# Patient Record
Sex: MALE | Race: WHITE | URBAN - METROPOLITAN AREA
[De-identification: names, ages, dates, MRNs, and addresses within clinical notes are randomized per-mention and may not be internally consistent; named-entity substitution may affect disease eponyms.]

---

## 2018-02-08 ENCOUNTER — TELEPHONE (OUTPATIENT)
Dept: FAMILY MEDICINE | Facility: CLINIC | Age: 33
End: 2018-02-08

## 2018-02-08 DIAGNOSIS — F41.8 MIXED ANXIETY DEPRESSIVE DISORDER: ICD-10-CM

## 2018-02-08 NOTE — TELEPHONE ENCOUNTER
"Requested Prescriptions   Pending Prescriptions Disp Refills     FLUoxetine (PROZAC) 10 MG capsule [Pharmacy Med Name: FLUOXETINE 10MG CAPSULES]  Last Written Prescription Date:  12/23/2016  Last Fill Quantity: 90 capsule,  # refills: 3   Last Office Visit with [facility], P or [facility] prescribing provider:  12/23/2016  Future Office Visit:      90 capsule 0     Sig: TAKE 1 CAPSULE BY MOUTH DAILY    SSRIs Protocol Failed    2/8/2018  1:33 PM  PHQ-9 SCORE 9/24/2013 8/3/2015 12/23/2016   Total Score 2 3 -   Total Score - - 1     ALESSANDRA-7 SCORE 9/25/2013 12/21/2015 12/23/2016   Total Score 4 - -   Total Score - 9 5          Failed - Recent or future visit with authorizing provider    Patient had office visit in the last year or has a visit in the next 30 days with authorizing provider.  See \"Patient Info\" tab in inbasket, or \"Choose Columns\" in Meds & Orders section of the refill encounter.            Passed - Patient is age 18 or older          "

## 2018-02-09 RX ORDER — FLUOXETINE 10 MG/1
CAPSULE ORAL
Qty: 30 CAPSULE | Refills: 0 | Status: SHIPPED | OUTPATIENT
Start: 2018-02-09 | End: 2018-02-20

## 2018-02-09 NOTE — TELEPHONE ENCOUNTER
Message left on home number for patient to call back clinic.  NTBS for depression/anciety check with PCP.    Allison LAZARO

## 2018-02-20 ENCOUNTER — OFFICE VISIT (OUTPATIENT)
Dept: FAMILY MEDICINE | Facility: CLINIC | Age: 33
End: 2018-02-20
Payer: COMMERCIAL

## 2018-02-20 VITALS
SYSTOLIC BLOOD PRESSURE: 112 MMHG | HEART RATE: 68 BPM | BODY MASS INDEX: 28.36 KG/M2 | DIASTOLIC BLOOD PRESSURE: 74 MMHG | TEMPERATURE: 98 F | WEIGHT: 221 LBS | HEIGHT: 74 IN

## 2018-02-20 DIAGNOSIS — Z23 NEED FOR PROPHYLACTIC VACCINATION WITH TETANUS-DIPHTHERIA (TD): ICD-10-CM

## 2018-02-20 DIAGNOSIS — F41.0 PANIC ATTACK: ICD-10-CM

## 2018-02-20 DIAGNOSIS — L30.9 ECZEMA, UNSPECIFIED TYPE: ICD-10-CM

## 2018-02-20 DIAGNOSIS — F41.8 MIXED ANXIETY DEPRESSIVE DISORDER: Primary | ICD-10-CM

## 2018-02-20 PROCEDURE — 99213 OFFICE O/P EST LOW 20 MIN: CPT | Performed by: PHYSICIAN ASSISTANT

## 2018-02-20 PROCEDURE — 90715 TDAP VACCINE 7 YRS/> IM: CPT | Performed by: PHYSICIAN ASSISTANT

## 2018-02-20 RX ORDER — CLOBETASOL PROPIONATE 0.5 MG/G
CREAM TOPICAL
Qty: 45 G | Refills: 0 | Status: SHIPPED | OUTPATIENT
Start: 2018-02-20

## 2018-02-20 RX ORDER — FLUOXETINE 10 MG/1
CAPSULE ORAL
Qty: 90 CAPSULE | Refills: 3 | Status: SHIPPED | OUTPATIENT
Start: 2018-02-20 | End: 2019-03-02

## 2018-02-20 RX ORDER — CLONAZEPAM 1 MG/1
0.5-1 TABLET ORAL 2 TIMES DAILY PRN
Qty: 20 TABLET | Refills: 0 | Status: SHIPPED | OUTPATIENT
Start: 2018-02-20 | End: 2019-04-04

## 2018-02-20 ASSESSMENT — ANXIETY QUESTIONNAIRES
GAD7 TOTAL SCORE: 6
5. BEING SO RESTLESS THAT IT IS HARD TO SIT STILL: SEVERAL DAYS
2. NOT BEING ABLE TO STOP OR CONTROL WORRYING: SEVERAL DAYS
1. FEELING NERVOUS, ANXIOUS, OR ON EDGE: MORE THAN HALF THE DAYS
7. FEELING AFRAID AS IF SOMETHING AWFUL MIGHT HAPPEN: NOT AT ALL
6. BECOMING EASILY ANNOYED OR IRRITABLE: NOT AT ALL
3. WORRYING TOO MUCH ABOUT DIFFERENT THINGS: SEVERAL DAYS

## 2018-02-20 ASSESSMENT — PATIENT HEALTH QUESTIONNAIRE - PHQ9: 5. POOR APPETITE OR OVEREATING: SEVERAL DAYS

## 2018-02-20 NOTE — NURSING NOTE
Handed patient 1 printed out prescription for:    clonazePAM (KLONOPIN) 1 MG tablet    Veena Love CMA

## 2018-02-20 NOTE — MR AVS SNAPSHOT
"              After Visit Summary   2018    Gaston Tuttle    MRN: [id number]           Patient Information     Date Of Birth          1985        Visit Information        Provider Department      2018 11:20 AM Octavio Rubio PA-C [facility]        Today's Diagnoses     Mixed anxiety depressive disorder    -  1    Panic attack        Eczema, unspecified type        Need for prophylactic vaccination with tetanus-diphtheria (TD)           Follow-ups after your visit        Who to contact     If you have questions or need follow up information about today's clinic visit or your schedule please contact [facility] directly at [phone].  Normal or non-critical lab and imaging results will be communicated to you by MyChart, letter or phone within 4 business days after the clinic has received the results. If you do not hear from us within 7 days, please contact the clinic through MyChart or phone. If you have a critical or abnormal lab result, we will notify you by phone as soon as possible.  Submit refill requests through [facility] or call your pharmacy and they will forward the refill request to us. Please allow 3 business days for your refill to be completed.          Additional Information About Your Visit        MyChart Information     [facility] lets you send messages to your doctor, view your test results, renew your prescriptions, schedule appointments and more. To sign up, go to [email or web address] . Click on \"Log in\" on the left side of the screen, which will take you to the Welcome page. Then click on \"Sign up Now\" on the right side of the page.     You will be asked to enter the access code listed below, as well as some personal information. Please follow the directions to create your username and password.     Your access code is: [email or web address]  Expires: 2018 12:06 PM     Your access code will  in 90 days. If you need help or a new " "code, please call your [place] clinic or [phone].        Care EveryWhere ID     This is your Care EveryWhere ID. This could be used by other organizations to access your [place] medical records  [id number]        Your Vitals Were     Pulse Temperature Height BMI (Body Mass Index)          68 98  F (36.7  C) (Oral) 6' 2\" (1.88 m) 28.37 kg/m2         Blood Pressure from Last 3 Encounters:   02/20/18 112/74   12/23/16 100/66   12/21/15 114/70    Weight from Last 3 Encounters:   02/20/18 221 lb (100.2 kg)   12/23/16 209 lb (94.8 kg)   12/21/15 202 lb (91.6 kg)              We Performed the Following     TDAP VACCINE (ADACEL)          Today's Medication Changes          These changes are accurate as of 2/20/18 12:06 PM.  If you have any questions, ask your nurse or doctor.               Start taking these medicines.        Dose/Directions    clobetasol 0.05 % cream   Commonly known as:  TEMOVATE   Used for:  Eczema, unspecified type   Started by:  Octavio Rubio PA-C        Apply sparingly to affected area twice daily as needed.  Do not apply to face.   Quantity:  45 g   Refills:  0         These medicines have changed or have updated prescriptions.        Dose/Directions    FLUoxetine 10 MG capsule   Commonly known as:  PROzac   This may have changed:  See the new instructions.   Used for:  Mixed anxiety depressive disorder   Changed by:  Octavio Rubio PA-C        TAKE 1 CAPSULE BY MOUTH DAILY   Quantity:  90 capsule   Refills:  3            Where to get your medicines      These medications were sent to "[id number] - [facility], [place] [address] AT [place] [address] [facility] [address], [facility] [address]     Phone:  [phone]     clobetasol 0.05 % cream    FLUoxetine 10 MG capsule         Some of these will need a paper prescription and others can be bought over the counter.  Ask your nurse if you have questions.     Bring a paper prescription for each of these " medications     clonazePAM 1 MG tablet                Primary Care Provider Office Phone # Fax #    Sissy Tijerina [phone]       [address]  [place] [address]        Equal Access to Services     YEN MORALES : Hadii aneta ku hadbradyo Soomaali, waaxda luqadaha, qaybta kaalmada adeegyada, kailey oliveran adebatsheva srivastava laasifligia nicholson. So [facility] [phone].    ATENCIÓN: Si habla español, tiene a sharpe disposición servicios gratuitos de asistencia lingüística. Llame al [phone].    We comply with applicable federal civil rights laws and [place] laws. We do not discriminate on the basis of race, color, national origin, age, disability, sex, sexual orientation, or gender identity.            Thank you!     Thank you for choosing [facility]  for your care. Our goal is always to provide you with excellent care. Hearing back from our patients is one way we can continue to improve our services. Please take a few minutes to complete the written survey that you may receive in the mail after your visit with us. Thank you!             Your Updated Medication List - Protect others around you: Learn how to safely use, store and throw away your medicines at www.disposemymeds.org.          This list is accurate as of 2/20/18 12:06 PM.  Always use your most recent med list.                   Brand Name Dispense Instructions for use Diagnosis    clobetasol 0.05 % cream    TEMOVATE    45 g    Apply sparingly to affected area twice daily as needed.  Do not apply to face.    Eczema, unspecified type       clonazePAM 1 MG tablet    klonoPIN    20 tablet    Take 0.5-1 tablets (0.5-1 mg) by mouth 2 times daily as needed for anxiety    Mixed anxiety depressive disorder       FLUoxetine 10 MG capsule    PROzac    90 capsule    TAKE 1 CAPSULE BY MOUTH DAILY    Mixed anxiety depressive disorder

## 2018-02-20 NOTE — NURSING NOTE
Prior to injection verified patient identity using patient's name and date of birth.  Veena Love CMA    Screening Questionnaire for Adult Immunization    Are you sick today?   No   Do you have allergies to medications, food, a vaccine component or latex?   No   Have you ever had a serious reaction after receiving a vaccination?   No   Do you have a long-term health problem with heart disease, lung disease, asthma, kidney disease, metabolic disease (e.g. diabetes), anemia, or other blood disorder?   No   Do you have cancer, leukemia, HIV/AIDS, or any other immune system problem?   No   In the past 3 months, have you taken medications that affect  your immune system, such as prednisone, other steroids, or anticancer drugs; drugs for the treatment of rheumatoid arthritis, Crohn s disease, or psoriasis; or have you had radiation treatments?   No   Have you had a seizure, or a brain or other nervous system problem?   No   During the past year, have you received a transfusion of blood or blood     products, or been given immune (gamma) globulin or antiviral drug?   No   For women: Are you pregnant or is there a chance you could become        pregnant during the next month?   No   Have you received any vaccinations in the past 4 weeks?   No     Immunization questionnaire answers were all negative.        Per orders of Octavio Rubio PA-C, injection of TDAP given by Veena Love. Patient instructed to remain in clinic for 15 minutes afterwards, and to report any adverse reaction to me immediately.       Screening performed by Veena Love on 2/20/2018 at 12:21 PM.

## 2018-02-20 NOTE — PROGRESS NOTES
"  SUBJECTIVE:   Gaston Tuttle is a 32 year old male who presents to clinic today for the following health issues:    Depression and Anxiety Follow-Up - Overall stable, doing well, no concerns.     Status since last visit: Stable     Other associated symptoms:None    Complicating factors:     Significant life event: No     Current substance abuse: None    PHQ-9 12/23/2016   Total Score 1   Q9: Suicide Ideation Not at all     ALESSANDRA-7 SCORE 9/25/2013 12/21/2015 12/23/2016   Total Score 4 - -   Total Score - 9 5     PHQ-9  English  PHQ-9   Any Language  ALESSANDRA-7  Suicide Assessment Five-step Evaluation and Treatment (SAFE-T)    Amount of exercise or physical activity: None    Problems taking medications regularly: No    Medication side effects: none    Diet: regular (no restrictions)    Patient Active Problem List   Diagnosis     Mixed anxiety depressive disorder     CARDIOVASCULAR SCREENING; LDL GOAL LESS THAN 160      Current Outpatient Prescriptions   Medication     FLUoxetine (PROZAC) 10 MG capsule     clonazePAM (KLONOPIN) 1 MG tablet     clobetasol (TEMOVATE) 0.05 % cream     [DISCONTINUED] FLUoxetine (PROZAC) 10 MG capsule     [DISCONTINUED] clonazePAM (KLONOPIN) 1 MG tablet     No current facility-administered medications for this visit.       Problem list and histories reviewed & adjusted, as indicated.  Additional history: as documented    Labs reviewed in EPIC    Reviewed and updated as needed this visit by clinical staff       Reviewed and updated as needed this visit by Provider         ROS:  Constitutional, HEENT, cardiovascular, pulmonary, gi and gu systems are negative, except as otherwise noted.    OBJECTIVE:     /74 (BP Location: Right arm, Cuff Size: Adult Large)  Pulse 68  Temp 98  F (36.7  C) (Oral)  Ht 6' 2\" (1.88 m)  Wt 221 lb (100.2 kg)  BMI 28.37 kg/m2  Body mass index is 28.37 kg/(m^2).  GENERAL: healthy, alert and no distress  Psych: Appropriate appearance.  Alert and oriented " times 3; coherent speech, normal   rate and volume, able to articulate logical thoughts, able   to abstract reason, no tangential thoughts, no hallucinations   or delusions.  Normal behavior.  His affect is bright.    SKIN: A few patches of scaling / erythema     ASSESSMENT/PLAN:     (F41.8) Mixed anxiety depressive disorder  (primary encounter diagnosis)  Comment:   Plan: FLUoxetine (PROZAC) 10 MG capsule, clonazePAM         (KLONOPIN) 1 MG tablet        Stable. Doing well. Very rare use of Clonazepam - 20 pills in 2 years. Refills given.     (F41.0) Panic attack  Comment:   Plan: As noted     (L30.9) Eczema, unspecified type  Comment:   Plan: clobetasol (TEMOVATE) 0.05 % cream        Small patches of eczema - has used this in the past, I cautioned that it is high dose to use only small amounts when absolutely needed. This prescription is given with a discussion of side effects, risks and proper use.  Instructions are given to follow up if not improving or symptoms change or worsen as discussed.     (Z23) Need for prophylactic vaccination with tetanus-diphtheria (TD)  Comment:   Plan: TDAP VACCINE (ADACEL)        Given    ALEXIS ACUÑA PA-C  [facility]

## 2018-02-21 ASSESSMENT — ANXIETY QUESTIONNAIRES: GAD7 TOTAL SCORE: 6

## 2018-02-21 ASSESSMENT — PATIENT HEALTH QUESTIONNAIRE - PHQ9: SUM OF ALL RESPONSES TO PHQ QUESTIONS 1-9: 4

## 2019-03-02 DIAGNOSIS — F41.8 MIXED ANXIETY DEPRESSIVE DISORDER: ICD-10-CM

## 2019-03-04 RX ORDER — FLUOXETINE 10 MG/1
CAPSULE ORAL
Qty: 30 CAPSULE | Refills: 0 | Status: SHIPPED | OUTPATIENT
Start: 2019-03-04 | End: 2019-04-04

## 2019-03-04 NOTE — TELEPHONE ENCOUNTER
"Requested Prescriptions   Pending Prescriptions Disp Refills     FLUoxetine (PROZAC) 10 MG capsule [Pharmacy Med Name: FLUOXETINE 10MG CAPSULES]  Last Written Prescription Date:  2/20/2018  Last Fill Quantity: 90 capsule,  # refills: 3   Last office visit: 2/20/2018 with prescribing provider:  HANNY Rubio   Future Office Visit:     90 capsule 0     Sig: TAKE 1 CAPSULE BY MOUTH DAILY    SSRIs Protocol Failed - 3/2/2019 12:01 PM       Failed - PHQ-9 score less than 5 in past 6 months    Please review last PHQ-9 score.     PHQ-9 SCORE 8/3/2015 12/23/2016 2/20/2018   PHQ-9 Total Score 3 - -   PHQ-9 Total Score - 1 4     ALESSANDRA-7 SCORE 12/21/2015 12/23/2016 2/20/2018   Total Score - - -   Total Score 9 5 6          Failed - Recent (6 mo) or future (30 days) visit within the authorizing provider's specialty    Patient had office visit in the last 6 months or has a visit in the next 30 days with authorizing provider or within the authorizing provider's specialty.  See \"Patient Info\" tab in inbasket, or \"Choose Columns\" in Meds & Orders section of the refill encounter.           Passed - Medication is active on med list       Passed - Patient is age 18 or older          "

## 2019-03-04 NOTE — TELEPHONE ENCOUNTER
Medication is being filled for 1 time refill only due to:  Patient needs to be seen because it has been more than one year since last visit.   Candace Mitchell RN

## 2019-04-04 ENCOUNTER — OFFICE VISIT (OUTPATIENT)
Dept: FAMILY MEDICINE | Facility: CLINIC | Age: 34
End: 2019-04-04
Payer: COMMERCIAL

## 2019-04-04 VITALS
BODY MASS INDEX: 28.54 KG/M2 | HEIGHT: 74 IN | WEIGHT: 222.4 LBS | SYSTOLIC BLOOD PRESSURE: 98 MMHG | TEMPERATURE: 97.8 F | HEART RATE: 72 BPM | DIASTOLIC BLOOD PRESSURE: 60 MMHG

## 2019-04-04 DIAGNOSIS — F40.298 SPECIFIC PHOBIA: ICD-10-CM

## 2019-04-04 DIAGNOSIS — Z83.49 FAMILY HISTORY OF THYROID DISEASE: ICD-10-CM

## 2019-04-04 DIAGNOSIS — F41.8 MIXED ANXIETY DEPRESSIVE DISORDER: ICD-10-CM

## 2019-04-04 DIAGNOSIS — R46.89 SPARSE EYEBROW: ICD-10-CM

## 2019-04-04 DIAGNOSIS — Z82.79 FAMILY HISTORY OF CONGENITAL HEART DEFECT: Primary | ICD-10-CM

## 2019-04-04 PROCEDURE — 99214 OFFICE O/P EST MOD 30 MIN: CPT | Performed by: PHYSICIAN ASSISTANT

## 2019-04-04 PROCEDURE — 84443 ASSAY THYROID STIM HORMONE: CPT | Performed by: PHYSICIAN ASSISTANT

## 2019-04-04 PROCEDURE — 36415 COLL VENOUS BLD VENIPUNCTURE: CPT | Performed by: PHYSICIAN ASSISTANT

## 2019-04-04 RX ORDER — FLUOXETINE 10 MG/1
10 CAPSULE ORAL DAILY
Qty: 90 CAPSULE | Refills: 0 | Status: SHIPPED | OUTPATIENT
Start: 2019-04-04

## 2019-04-04 RX ORDER — CLONAZEPAM 1 MG/1
0.5-1 TABLET ORAL 2 TIMES DAILY PRN
Qty: 20 TABLET | Refills: 0 | Status: SHIPPED | OUTPATIENT
Start: 2019-04-04

## 2019-04-04 ASSESSMENT — PAIN SCALES - GENERAL: PAINLEVEL: NO PAIN (1)

## 2019-04-04 ASSESSMENT — ANXIETY QUESTIONNAIRES
1. FEELING NERVOUS, ANXIOUS, OR ON EDGE: NEARLY EVERY DAY
GAD7 TOTAL SCORE: 11
7. FEELING AFRAID AS IF SOMETHING AWFUL MIGHT HAPPEN: MORE THAN HALF THE DAYS
2. NOT BEING ABLE TO STOP OR CONTROL WORRYING: MORE THAN HALF THE DAYS
3. WORRYING TOO MUCH ABOUT DIFFERENT THINGS: MORE THAN HALF THE DAYS
5. BEING SO RESTLESS THAT IT IS HARD TO SIT STILL: NOT AT ALL
IF YOU CHECKED OFF ANY PROBLEMS ON THIS QUESTIONNAIRE, HOW DIFFICULT HAVE THESE PROBLEMS MADE IT FOR YOU TO DO YOUR WORK, TAKE CARE OF THINGS AT HOME, OR GET ALONG WITH OTHER PEOPLE: VERY DIFFICULT
6. BECOMING EASILY ANNOYED OR IRRITABLE: NOT AT ALL

## 2019-04-04 ASSESSMENT — PATIENT HEALTH QUESTIONNAIRE - PHQ9
SUM OF ALL RESPONSES TO PHQ QUESTIONS 1-9: 8
5. POOR APPETITE OR OVEREATING: MORE THAN HALF THE DAYS

## 2019-04-04 ASSESSMENT — MIFFLIN-ST. JEOR: SCORE: 2013.55

## 2019-04-04 NOTE — PROGRESS NOTES
"  SUBJECTIVE:   Gaston Tuttle is a 33 year old male who presents to clinic today for the following health issues:    Depression and Anxiety Follow-Up    Status since last visit: Worsened, patient was off of prozac    Other associated symptoms: fidgety, feeling awful, feeling nausea     Complicating factors:     Significant life event: Yes      Current substance abuse: None    Life stressors. Girlfriend challenges. Work / work changes and changes related to an NATHANIEL he was working on have increased his stress and anxiety. Was on Prozac. Has seen a therapist. He's wondering about getting back on Prozac.     PHQ 12/23/2016 2/20/2018   PHQ-9 Total Score 1 4   Q9: Thoughts of better off dead/self-harm past 2 weeks Not at all Not at all     ALESSANDRA-7 SCORE 12/21/2015 12/23/2016 2/20/2018   Total Score - - -   Total Score 9 5 6       PHQ-9  English  PHQ-9   Any Language  ALESSANDRA-7  Suicide Assessment Five-step Evaluation and Treatment (SAFE-T)    Amount of exercise or physical activity: None    Problems taking medications regularly: No    Medication side effects: none    Diet: regular (no restrictions)    Patient would like to check thyroid levels, Patient's mom has hypothyroidism. He's noticed that his eyelids have been thin.     Had mild \"influenza\" a few weeks ago - mild fevers, very nauseated. That resolved.     Reports that his brother and father both have congenital heart defects - brother has  Bicuspid mitral valve and has an aortic aneurysm. Father had a few issues, valve disorder, whole in the heart, etc. Not specific. He is asymptomatic. He was told to see  A cardiologist.     Patient Active Problem List   Diagnosis     Mixed anxiety depressive disorder     CARDIOVASCULAR SCREENING; LDL GOAL LESS THAN 160      Problem list and histories reviewed & adjusted, as indicated.  Additional history: as documented    Labs reviewed in EPIC    Reviewed and updated as needed this visit by clinical staff  Tobacco  Allergies " " Meds  Med Hx  Surg Hx  Fam Hx  Soc Hx      Reviewed and updated as needed this visit by Provider         ROS:  Constitutional, HEENT, cardiovascular, pulmonary, GI, , musculoskeletal, neuro, skin, endocrine and psych systems are negative, except as otherwise noted.    OBJECTIVE:     BP 98/60 (BP Location: Right arm, Patient Position: Chair, Cuff Size: Adult Regular)   Pulse 72   Temp 97.8  F (36.6  C) (Oral)   Ht 1.88 m (6' 2\")   Wt 100.9 kg (222 lb 6.4 oz)   BMI 28.55 kg/m    Body mass index is 28.55 kg/m .  GENERAL: healthy, alert and no distress  EYES: Eyes grossly normal to inspection, PERRL and conjunctivae and sclerae normal  HENT: ear canals and TM's normal, nose and mouth without ulcers or lesions  NECK: no adenopathy, no asymmetry, masses, or scars and thyroid normal to palpation  RESP: lungs clear to auscultation - no rales, rhonchi or wheezes  CV: regular rate and rhythm, normal S1 S2, no S3 or S4, no murmur, click or rub, no peripheral edema and peripheral pulses strong  ABDOMEN: soft, nontender, no hepatosplenomegaly, no masses and bowel sounds normal  MS: no gross musculoskeletal defects noted, no edema  SKIN: no suspicious lesions or rashes  NEURO: Normal strength and tone, mentation intact and speech normal  PSYCH: talkative, a bit disorganized but otherwise mentation appears normal, affect normal/bright  LYMPH: no cervical, supraclavicular, axillary, or inguinal adenopathy    Diagnostic Test Results:  none     ASSESSMENT/PLAN:     (Z82.79) Family history of congenital heart defect  (primary encounter diagnosis)  Comment:   Plan: CARDIOLOGY EVAL ADULT REFERRAL        Will refer to Cardiology.     (Z83.49) Family history of thyroid disease  Comment:   Plan: TSH        Checking     (Q18.8) Sparse eyebrow  Comment:   Plan: Exam benign    (F40.298) Specific phobia  Comment:   Plan: Fear of vomiting. Reassurance given. Could consider therapy.     (F41.8) Mixed anxiety depressive " disorder  Comment:   Plan: FLUoxetine (PROZAC) 10 MG capsule, clonazePAM         (KLONOPIN) 1 MG tablet        Reviewed - recommend therapy. Recommend restart Prozac. He seems pretty keyed up today - discussed relaxation, self cares. Clonazepam for rare, judicious use with panic if arises. Follow up PRN. This prescription is given with a discussion of side effects, risks and proper use.  Instructions are given to follow up if not improving or symptoms change or worsen as discussed.       ALEXIS ACUÑA PA-C  [facility]

## 2019-04-05 LAB — TSH SERPL DL<=0.005 MIU/L-ACNC: 1 MU/L (ref 0.4–4)

## 2019-04-05 ASSESSMENT — ANXIETY QUESTIONNAIRES: GAD7 TOTAL SCORE: 11

## 2020-03-02 ENCOUNTER — HEALTH MAINTENANCE LETTER (OUTPATIENT)
Age: 35
End: 2020-03-02

## 2020-12-14 ENCOUNTER — HEALTH MAINTENANCE LETTER (OUTPATIENT)
Age: 35
End: 2020-12-14

## 2021-04-18 ENCOUNTER — HEALTH MAINTENANCE LETTER (OUTPATIENT)
Age: 36
End: 2021-04-18

## 2021-10-02 ENCOUNTER — HEALTH MAINTENANCE LETTER (OUTPATIENT)
Age: 36
End: 2021-10-02

## 2022-05-14 ENCOUNTER — HEALTH MAINTENANCE LETTER (OUTPATIENT)
Age: 37
End: 2022-05-14

## 2022-09-03 ENCOUNTER — HEALTH MAINTENANCE LETTER (OUTPATIENT)
Age: 37
End: 2022-09-03

## 2023-06-03 ENCOUNTER — HEALTH MAINTENANCE LETTER (OUTPATIENT)
Age: 38
End: 2023-06-03